# Patient Record
Sex: FEMALE | Employment: UNEMPLOYED | URBAN - METROPOLITAN AREA
[De-identification: names, ages, dates, MRNs, and addresses within clinical notes are randomized per-mention and may not be internally consistent; named-entity substitution may affect disease eponyms.]

---

## 2020-02-24 ENCOUNTER — OFFICE VISIT (OUTPATIENT)
Dept: FAMILY MEDICINE CLINIC | Age: 25
End: 2020-02-24

## 2020-02-24 VITALS
TEMPERATURE: 98.2 F | DIASTOLIC BLOOD PRESSURE: 65 MMHG | OXYGEN SATURATION: 98 % | WEIGHT: 139.6 LBS | RESPIRATION RATE: 20 BRPM | BODY MASS INDEX: 20.68 KG/M2 | HEART RATE: 91 BPM | HEIGHT: 69 IN | SYSTOLIC BLOOD PRESSURE: 110 MMHG

## 2020-02-24 DIAGNOSIS — J01.40 SUBACUTE PANSINUSITIS: Primary | ICD-10-CM

## 2020-02-24 RX ORDER — AMOXICILLIN 875 MG/1
875 TABLET, FILM COATED ORAL 2 TIMES DAILY
Qty: 20 TAB | Refills: 0 | Status: SHIPPED | OUTPATIENT
Start: 2020-02-24 | End: 2020-02-24 | Stop reason: ALTCHOICE

## 2020-02-24 RX ORDER — ALBUTEROL SULFATE 0.63 MG/3ML
0.63 SOLUTION RESPIRATORY (INHALATION)
COMMUNITY

## 2020-02-24 RX ORDER — PENICILLIN V POTASSIUM 500 MG/1
500 TABLET, FILM COATED ORAL 2 TIMES DAILY
Qty: 20 TAB | Refills: 0 | Status: SHIPPED | OUTPATIENT
Start: 2020-02-24 | End: 2020-03-02 | Stop reason: ALTCHOICE

## 2020-02-24 NOTE — PROGRESS NOTES
Subjective     Patient ID:  Luisa Hernandez is a 27 y.o. ( 1995) female who presents for the following:   Establish Care      HPI   She presents as a new patient to establish care. She is about 5 to 10 weeks pregnant. Will be establishing care  next week with OB/GYN for prenatal care. Cough: Onset 1 month ago. Reports having bronchitis 2 months ago which improved then worsened again. Now reports a cough productive of clear sputum, nasal congestion, feeling of fluid in the ears, and chills. She has a history of asthma. Reports her symptoms have been worse this winter. Albuterol inhaler has been effective. Used 5 or 6 times in the last few weeks. Review of Systems   Constitutional: Positive for chills. Negative for appetite change, diaphoresis, fatigue and fever. HENT: Positive for congestion. Negative for ear discharge, ear pain, hearing loss, postnasal drip, rhinorrhea, sinus pressure, sinus pain, sneezing, sore throat and trouble swallowing. Eyes: Negative for discharge, redness and visual disturbance. Respiratory: Positive for cough. Negative for chest tightness, shortness of breath and wheezing. Cardiovascular: Negative for chest pain. Gastrointestinal: Negative for abdominal pain, diarrhea, nausea and vomiting. Genitourinary: Negative for decreased urine volume. Musculoskeletal: Negative for myalgias, neck pain and neck stiffness. Skin: Negative for rash. Allergic/Immunologic: Negative for environmental allergies. Neurological: Negative for dizziness, light-headedness and headaches. Past Medical History, Past Surgery History, Allergies, Social History, and Family History were reviewed and updated. There is no problem list on file for this patient. Past Medical History:   Diagnosis Date    Asthma     Endometriosis      Patient Care Team:  Karen Mills NP as PCP - General (Nurse Practitioner)    No past surgical history on file.   No family history on file. Social History     Tobacco Use    Smoking status: Former Smoker     Last attempt to quit: 2020     Years since quittin.0    Smokeless tobacco: Never Used   Substance Use Topics    Alcohol use: Not Currently    Drug use: Never     No Known Allergies  Current Outpatient Medications on File Prior to Visit   Medication Sig Dispense Refill    prenatal multivit-ca-min-fe-fa tab Take  by mouth.  albuterol (ACCUNEB) 0.63 mg/3 mL nebulizer solution 0.63 mg by Nebulization route every six (6) hours as needed for Wheezing. No current facility-administered medications on file prior to visit. Health Maintenance Due   Topic Date Due    HPV Age 9Y-34Y (1 - Female 2-dose series) 2008    DTaP/Tdap/Td series (1 - Tdap) 2008    PAP AKA CERVICAL CYTOLOGY  2018    Influenza Age 5 to Adult  2019         Objective     Visit Vitals  /65   Pulse 91   Temp 98.2 °F (36.8 °C) (Oral)   Resp 20   Ht 5' 9\" (1.753 m)   Wt 139 lb 9.6 oz (63.3 kg)   SpO2 98%   BMI 20.62 kg/m²     No LMP recorded. Patient is pregnant. Physical Exam  Constitutional:       General: She is not in acute distress. Appearance: She is well-developed. She is not toxic-appearing or diaphoretic. HENT:      Right Ear: Hearing and ear canal normal. Tympanic membrane is retracted. Tympanic membrane is not erythematous. Left Ear: Hearing and ear canal normal. Tympanic membrane is retracted. Tympanic membrane is not erythematous. Nose: Congestion present. Right Sinus: Maxillary sinus tenderness present. Left Sinus: Maxillary sinus tenderness present. Mouth/Throat:      Pharynx: No oropharyngeal exudate, posterior oropharyngeal erythema or uvula swelling. Tonsils: No tonsillar abscesses. Eyes:      General:         Right eye: No discharge. Left eye: No discharge. Conjunctiva/sclera: Conjunctivae normal.   Neck:      Musculoskeletal: Neck supple. Cardiovascular:      Rate and Rhythm: Normal rate and regular rhythm. Heart sounds: Normal heart sounds. No murmur. Pulmonary:      Effort: Pulmonary effort is normal. No respiratory distress. Breath sounds: Normal breath sounds. No decreased breath sounds, wheezing, rhonchi or rales. Lymphadenopathy:      Cervical: Cervical adenopathy present. Skin:     General: Skin is warm and dry. Findings: No rash. Neurological:      Mental Status: She is alert and oriented to person, place, and time. LABS     TESTS      Assessment and Plan     1. Subacute pansinusitis  Saline nasal spray as needed for congestion. Penicillin as directed. Follow-up as needed. - penicillin v potassium (VEETID) 500 mg tablet; Take 1 Tab by mouth two (2) times a day for 10 days. Dispense: 20 Tab; Refill: 0          Risks, benefits, and alternatives of the medications and treatment plan prescribed today were discussed, and patient expressed understanding. Printed after visit summary was given to patient and reviewed. All patient questions and concerns were addressed. Plan follow-up as discussed or as needed if any worsening symptoms or change in condition.            Signed electronically by Jodi Keene DNP, FNP-BC

## 2020-02-24 NOTE — PROGRESS NOTES
Ayleen Mosqueda presents today for   Chief Complaint   Patient presents with   [address]       Is someone accompanying this pt? no    Is the patient using any DME equipment during OV? no    Depression Screening:  3 most recent PHQ Screens 2/24/2020   Little interest or pleasure in doing things Not at all   Feeling down, depressed, irritable, or hopeless Not at all   Total Score PHQ 2 0       Learning Assessment:  No flowsheet data found. Abuse Screening:  No flowsheet data found. Fall Risk  No flowsheet data found. Health Maintenance reviewed and discussed and ordered per Provider. Health Maintenance Due   Topic Date Due    HPV Age 9Y-34Y (1 - Female 2-dose series) 03/18/2008    DTaP/Tdap/Td series (1 - Tdap) 03/18/2008    PAP AKA CERVICAL CYTOLOGY  03/18/2018    Influenza Age 9 to Adult  08/01/2019   . Coordination of Care:  1. Have you been to the ER, urgent care clinic since your last visit? Hospitalized since your last visit? no    2. Have you seen or consulted any other health care providers outside of the [address] [place] [address] since your last visit? Include any pap smears or colon screening.  no      Last  Checked na  Last UDS Checked na  Last Pain contract signed: na

## 2020-02-24 NOTE — PATIENT INSTRUCTIONS
Saline nasal spray. If you get morning sickness, pyridoxine (vitamin B6) once or twice a day, doxylamine (Unisom) at night. Sinusitis: Care Instructions  Your Care Instructions    Sinusitis is an infection of the lining of the sinus cavities in your head. Sinusitis often follows a cold. It causes pain and pressure in your head and face. In most cases, sinusitis gets better on its own in 1 to 2 weeks. But some mild symptoms may last for several weeks. Sometimes antibiotics are needed. Follow-up care is a key part of your treatment and safety. Be sure to make and go to all appointments, and call your doctor if you are having problems. It's also a good idea to know your test results and keep a list of the medicines you take. How can you care for yourself at home? · Take an over-the-counter pain medicine, such as acetaminophen (Tylenol), ibuprofen (Advil, Motrin), or naproxen (Aleve). Read and follow all instructions on the label. · If the doctor prescribed antibiotics, take them as directed. Do not stop taking them just because you feel better. You need to take the full course of antibiotics. · Be careful when taking over-the-counter cold or flu medicines and Tylenol at the same time. Many of these medicines have acetaminophen, which is Tylenol. Read the labels to make sure that you are not taking more than the recommended dose. Too much acetaminophen (Tylenol) can be harmful. · Breathe warm, moist air from a steamy shower, a hot bath, or a sink filled with hot water. Avoid cold, dry air. Using a humidifier in your home may help. Follow the directions for cleaning the machine. · Use saline (saltwater) nasal washes to help keep your nasal passages open and wash out mucus and bacteria. You can buy saline nose drops at a grocery store or drugstore. Or you can make your own at home by adding 1 teaspoon of salt and 1 teaspoon of baking soda to 2 cups of distilled water.  If you make your own, fill a bulb syringe with the solution, insert the tip into your nostril, and squeeze gently. Melida Costa your nose. · Put a hot, wet towel or a warm gel pack on your face 3 or 4 times a day for 5 to 10 minutes each time. · Try a decongestant nasal spray like oxymetazoline (Afrin). Do not use it for more than 3 days in a row. Using it for more than 3 days can make your congestion worse. When should you call for help? Call your doctor now or seek immediate medical care if:    · You have new or worse swelling or redness in your face or around your eyes.     · You have a new or higher fever.    Watch closely for changes in your health, and be sure to contact your doctor if:    · You have new or worse facial pain.     · The mucus from your nose becomes thicker (like pus) or has new blood in it.     · You are not getting better as expected. Where can you learn more? Go to http://ham-chelo.info/. Enter O987 in the search box to learn more about \"Sinusitis: Care Instructions. \"  Current as of: October 21, 2018  Content Version: 12.2  © 6041-5783 [employer]. Care instructions adapted under license by [employer] (which disclaims liability or warranty for this information). If you have questions about a medical condition or this instruction, always ask your healthcare professional. [address] any warranty or liability for your use of this information.

## 2020-03-02 ENCOUNTER — TELEPHONE (OUTPATIENT)
Dept: FAMILY MEDICINE CLINIC | Age: 25
End: 2020-03-02

## 2020-03-02 DIAGNOSIS — J01.40 SUBACUTE PANSINUSITIS: Primary | ICD-10-CM

## 2020-03-02 RX ORDER — CEFDINIR 300 MG/1
300 CAPSULE ORAL 2 TIMES DAILY
Qty: 20 CAP | Refills: 0 | Status: SHIPPED | OUTPATIENT
Start: 2020-03-02 | End: 2020-03-12

## 2020-03-02 NOTE — TELEPHONE ENCOUNTER
Pt. Stated the penicillin is not working and she stated she has been taking medication for 8 days. She stated her symptoms are getting worse. She is seeking medical advice.  Please assist.

## 2020-10-13 PROBLEM — Z34.90 TERM PREGNANCY: Status: ACTIVE | Noted: 2020-10-13

## 2020-10-14 PROBLEM — Z34.90 TERM PREGNANCY: Status: RESOLVED | Noted: 2020-10-13 | Resolved: 2020-10-14

## 2020-10-14 PROBLEM — B00.9 MATERNAL ACTIVE HSV, DELIVERED, CURRENT HOSPITALIZATION: Status: ACTIVE | Noted: 2020-10-14

## 2020-10-16 PROBLEM — L76.82 INCISIONAL PAIN: Status: ACTIVE | Noted: 2020-10-16
